# Patient Record
Sex: FEMALE | Race: OTHER | HISPANIC OR LATINO | URBAN - METROPOLITAN AREA
[De-identification: names, ages, dates, MRNs, and addresses within clinical notes are randomized per-mention and may not be internally consistent; named-entity substitution may affect disease eponyms.]

---

## 2020-01-22 ENCOUNTER — HOSPITAL ENCOUNTER (EMERGENCY)
Facility: HOSPITAL | Age: 25
Discharge: HOME | End: 2020-01-22
Attending: EMERGENCY MEDICINE
Payer: MEDICAID

## 2020-01-22 VITALS
OXYGEN SATURATION: 97 % | HEART RATE: 86 BPM | TEMPERATURE: 99.1 F | DIASTOLIC BLOOD PRESSURE: 68 MMHG | RESPIRATION RATE: 16 BRPM | SYSTOLIC BLOOD PRESSURE: 133 MMHG

## 2020-01-22 DIAGNOSIS — J06.9 UPPER RESPIRATORY INFECTION: Primary | ICD-10-CM

## 2020-01-22 DIAGNOSIS — H10.9 CONJUNCTIVITIS: ICD-10-CM

## 2020-01-22 PROCEDURE — 99283 EMERGENCY DEPT VISIT LOW MDM: CPT

## 2020-01-22 PROCEDURE — 99284 EMERGENCY DEPT VISIT MOD MDM: CPT | Performed by: PHYSICIAN ASSISTANT

## 2020-01-22 RX ORDER — ERYTHROMYCIN 5 MG/G
0.5 OINTMENT OPHTHALMIC EVERY 6 HOURS
Qty: 3.5 G | Refills: 0 | Status: SHIPPED | OUTPATIENT
Start: 2020-01-22 | End: 2020-02-01

## 2020-01-22 RX ORDER — LORATADINE AND PSEUDOEPHEDRINE 10; 240 MG/1; MG/1
1 TABLET, EXTENDED RELEASE ORAL DAILY
Qty: 10 TABLET | Refills: 0 | Status: SHIPPED | OUTPATIENT
Start: 2020-01-22 | End: 2020-02-01

## 2020-01-22 RX ORDER — TRIAMCINOLONE ACETONIDE 55 UG/1
2 SPRAY, METERED NASAL DAILY
Qty: 1 BOTTLE | Refills: 0 | Status: SHIPPED | OUTPATIENT
Start: 2020-01-22

## 2020-01-22 NOTE — DISCHARGE INSTRUCTIONS
Infección respiratoria superior   LO QUE NECESITA SABER:   Tessa infección respiratoria superior también se conoce duncan el resfriado común  Shy es tessa infección que puede afectar herzog nariz, garganta, oídos y los senos frontales  Para personas saludables, el resfriado común generalmente no es grave y no requiere tratamiento especial  Los síntomas del resfriado generalmente son [place] graves susan los primeros 3 a 5 días  [place] en 7 a 14 días  Puede que usted siga tosiendo por 2 a 3 semanas  Los resfriados son provocados por virus y no mejoran con antibióticos  INSTRUCCIONES SOBRE [facility]:   Regrese a la yordy de emergencias si:   · Usted tiene dolor torácico y dificultad para respirar  Pregúntele a herzog Jerrye Rashid vitaminas y minerales son adecuados para usted  · Usted tiene fiebre de más de 102ºF [facility])  · Herzog garganta irritada empeora o usted ve manchas morgan o opal en herzog garganta  · Hermila síntomas empeoran después de 3 a 5 días o herzog resfriado no mejora en 14 días  · Usted tiene sarpullido en alguna parte de herzog piel  · Usted tiene bultos grandes y sensible en herzog anisa    · Usted tiene secreción espesa de color juvenal o amarillo proveniente de herzog nariz  · Usted expectora mucosidad de color amarillo, juvenal o con Tamika  · Usted vomita por más de 24 horas y no puede retener líquidos en el estómago  · Usted tiene un grave dolor de oído  · Usted tiene preguntas o inquietudes acerca de herzog condición o cuidado  Medicamentos:  Es posible que usted necesite alguno de los siguientes:  · Los descongestionantes  ayudan a reducir la congestión nasal y [facility] a respirar más fácilmente  Si candice pastillas descongestionantes, pueden causarle agitación o problemas para dormir  No use aerosol descongestionante por más de unos cuantos días  · Los jarabes para la tos  ayudan a reducir la tos   Pregúntele a herzog médico cuál tipo de medicamento para la tos es mejor para usted  · AINEs (Analgésicos antiinflamatorios no esteroides) duncan el ibuprofeno, ayudan a disminuir la inflamación, el dolor y la [place]  Los AINEs pueden causar sangrado estomacal o problemas renales en ciertas personas  Si usted candice un medicamento anticoagulante, siempre pregúntele a brand médico si los MEAGHAN son seguros para usted  Siempre gladys la etiqueta de evie medicamento y [place] instrucciones  · Acetaminofeno:  toy el dolor y baja la fiebre  Está disponible sin receta médica  Pregunte la cantidad y la frecuencia con que debe tomarlos  [facility] [address]  Gladys las etiquetas de todos los demás medicamentos que esté usando para saber si también contienen acetaminofén, o pregunte a brand médico o farmacéutico  El acetaminofén puede causar daño en el hígado cuando no se candice de forma correcta  No use más de 4 gramos (4000 miligramos) en total de acetaminofeno en un día  · [place] louis medicamentos duncan se le haya indicado  Consulte con brand médico si usted sia que brand medicamento no le está ayudando o si presenta efectos secundarios  Infórmele si es alérgico a cualquier medicamento  Mantenga mary lista actualizada de los [employer], las vitaminas y los productos herbales que candice  Incluya los siguientes datos de los medicamentos: cantidad, frecuencia y motivo de administración  Traiga con usted la lista o los envases de la píldoras a louis citas de seguimiento  Lleve la lista de los medicamentos con usted en [place] de mary emergencia  Acuda a louis consultas de control con brand médico según le indicaron  Anote louis preguntas para que se acuerde de hacerlas susan louis visitas  Cuidados personales:   · Descanse el mayor tiempo posible  Empiece a hacer un poco más día a día  · [employer] líquidos duncan se le indique  Los líquidos le ayudarán a aflojar y disolver la mucosidad para que la pueda expectorar  Los líquidos ayudarán a evitar la deshidratación   Los líquidos que ayudan a prevenir la deshidratación pueden ser [place], jugo de fruta y caldo  No tome líquidos que contienen cafeína  La cafeína puede aumentar el riesgo de deshidratación  Pregúntele a brand médico cuál es la cantidad de líquido que necesita ingerir a diario  · Alivie el dolor de garganta  Freya gárgaras de agua tibia con sal  [place] ayuda a aliviar el dolor de garganta  Prepare agua salina disolviendo ¼ de cucharada de sal a 1 taza de agua tibia  Usted puede también chupar dulces duros o pastillas para la garganta  Usted puede usar aerosol para el dolor de garganta  · Use un humidificador o vaporizador  Use un humidificador de john frío o un vaporizador para elevar la humedad en brand casa  [place] podría ayudarle a respirar más fácilmente y al mismo tiempo disminuir la tos  · Use gotas nasales de solución salina duncan se le haya indicado  Estas ayudan a [place]  · Aplique vaselina en la parte externa alrededor de las fosas nasales  Esta puede disminuir la irritación de sonarse la nariz  · No fume  La nicotina y otros químicos en los cigarrillos y cigarros pueden empeorar louis síntomas  También pueden causar infecciones duncan la bronquitis o la neumonía  Pida información a brand médico si usted actualmente fuma y necesita ayuda para dejar de fumar  Los cigarrillos electrónicos o tabaco sin humo todavía contienen nicotina  Consulte con brand médico antes de [employer]  Evite transmitir brand resfriado a los demás:   · Trate de mantenerse alejado de otras personas susan los primeros 2 a 3 días de brand resfriado cuando éste es más fácil de transmitir  · No comparta alimentos o bebidas  · No comparta toallas de mano con otros miembros de paula en el hogar  · [employer] frecuentemente, especialmente después de sonarse la nariz  [place]awk la espalda a otras personas y cúbrase la boca y la nariz con un pañuelo desechable cuando estornuda o tose         © 2017 [address] [place] Information is for End User's use only and may not be sold, redistributed or otherwise used for commercial purposes  All illustrations and images included in CareNotes® are the copyrighted property of A D A M , Inc  or Bud Gordon  Esta información es sólo para uso en educación  Brand intención no es darle un consejo médico sobre enfermedades o tratamientos  Colsulte con brand Lexx Larsen farmacéutico antes de seguir cualquier régimen médico para saber si es seguro y efectivo para usted

## 2020-01-22 NOTE — ED PROVIDER NOTES
History  Chief Complaint   Patient presents with    URI     headache, sore throat, ear pain, eye pain, cough x 2 weeks  61-year-old female presents to emergency room for evaluation of headache, nasal congestion, sore throat, ear pain, eye pain, and cough  Patient states the nasal congestion and cough have been for the past 2 weeks however her eyes started bothering her 3 days ago  She states they are red and when she wakes up in the morning they are glued shut  Patient took penicillin she had at home for her throat which has improved  She also took other over-the-counter liquid cold medicine  History provided by:  Patient   used: Yes (EDAN [id number])        None       History reviewed  No pertinent past medical history  History reviewed  No pertinent surgical history  History reviewed  No pertinent family history  I have reviewed and agree with the history as documented  Social History     Tobacco Use    Smoking status: Never Smoker    Smokeless tobacco: Never Used   Substance Use Topics    Alcohol use: Not Currently    Drug use: Never        Review of Systems   Constitutional: Negative for chills and fever  HENT: Positive for congestion, ear pain and sore throat  Eyes: Positive for pain and discharge  Respiratory: Positive for cough  Cardiovascular: Negative for chest pain  Gastrointestinal: Negative for vomiting  Skin: Negative for rash and wound  Neurological: Positive for headaches  Physical Exam  Physical Exam   Constitutional: She appears well-developed and well-nourished  HENT:   Right Ear: Tympanic membrane and external ear normal    Left Ear: Tympanic membrane and external ear normal    Nose: Mucosal edema present  No rhinorrhea  Mouth/Throat: Uvula is midline, oropharynx is clear and moist and mucous membranes are normal  No tonsillar exudate  Eyes: Lids are normal  Right eye exhibits no discharge  Left eye exhibits no discharge  Right conjunctiva is injected  Left conjunctiva is injected  Neck: Neck supple  Cardiovascular: Normal rate, regular rhythm and normal heart sounds  Pulmonary/Chest: Effort normal and breath sounds normal    Lymphadenopathy:     She has no cervical adenopathy  Neurological: She is alert  Skin: Skin is warm and dry  No rash noted  Psychiatric: She has a normal mood and affect  Nursing note and vitals reviewed  Vital Signs  ED Triage Vitals [01/22/20 0913]   Temperature Pulse Respirations Blood Pressure SpO2   99 1 °F (37 3 °C) 86 16 133/68 97 %      Temp Source Heart Rate Source Patient Position - Orthostatic VS BP Location FiO2 (%)   Oral -- -- -- --      Pain Score       --           Vitals:    01/22/20 0913   BP: 133/68   Pulse: 86         Visual Acuity      ED Medications  Medications - No data to display    Diagnostic Studies  Results Reviewed     None                 No orders to display              Procedures  Procedures         ED Course                               MDM      Disposition  Final diagnoses:   Upper respiratory infection   Conjunctivitis     Time reflects when diagnosis was documented in both MDM as applicable and the Disposition within this note     Time User Action Codes Description Comment    1/22/2020 10:16 AM Kamaljit JENSEN Add [J06 9] Upper respiratory infection     1/22/2020 10:16 AM Yfn Guillen Add [H10 9] Conjunctivitis       ED Disposition     ED Disposition Condition Date/Time Comment    Discharge Stable Wed Jan 22, 2020 10:15 AM Elifta Adebayo discharge to home/self care              Follow-up Information     Follow up With Specialties Details Why Contact Info Shaun Johns  In 3 days  1 [facility] [address] [place] [id number] [id number] [facility] Emergency Department Emergency Medicine  If symptoms worsen [address] [facility] ED, [address], [place], [place], [address]   [phone]          Patient's Medications   Discharge Prescriptions    ERYTHROMYCIN (ILOTYCIN) OPHTHALMIC OINTMENT    Administer 0 5 inches to both eyes every 6 (six) hours for 10 days       Start Date: 1/22/2020 End Date: 2/1/2020       Order Dose: 0 5 inches       Quantity: 3 5 g    Refills: 0    LORATADINE-PSEUDOEPHEDRINE (CLARITIN-D 24-HOUR)  MG PER 24 HR TABLET    Take 1 tablet by mouth daily for 10 days       Start Date: 1/22/2020 End Date: 2/1/2020       Order Dose: 1 tablet       Quantity: 10 tablet    Refills: 0    TRIAMCINOLONE ACETONIDE 55 MCG/ACT AERO    2 Act (110 mcg total) into each nostril daily       Start Date: 1/22/2020 End Date: --       Order Dose: 110 mcg       Quantity: 1 Bottle    Refills: 0     No discharge procedures on file      ED Provider  Electronically Signed by           Lea Nicole PA-C  01/22/20 7255